# Patient Record
Sex: FEMALE | Race: WHITE | Employment: OTHER | URBAN - METROPOLITAN AREA
[De-identification: names, ages, dates, MRNs, and addresses within clinical notes are randomized per-mention and may not be internally consistent; named-entity substitution may affect disease eponyms.]

---

## 2018-09-30 ENCOUNTER — HOSPITAL ENCOUNTER (EMERGENCY)
Age: 78
Discharge: HOME | End: 2018-09-30
Attending: EMERGENCY MEDICINE
Payer: MEDICARE

## 2018-09-30 ENCOUNTER — APPOINTMENT (OUTPATIENT)
Dept: CT IMAGING | Age: 78
End: 2018-09-30
Payer: MEDICARE

## 2018-09-30 VITALS
RESPIRATION RATE: 16 BRPM | BODY MASS INDEX: 29.39 KG/M2 | OXYGEN SATURATION: 99 % | WEIGHT: 182.1 LBS | DIASTOLIC BLOOD PRESSURE: 58 MMHG | SYSTOLIC BLOOD PRESSURE: 146 MMHG | TEMPERATURE: 98.2 F | HEART RATE: 94 BPM

## 2018-09-30 DIAGNOSIS — R53.1 GENERALIZED WEAKNESS: Primary | ICD-10-CM

## 2018-09-30 LAB
A/G RATIO: 1.4 (ref 1.1–2.2)
ALBUMIN SERPL-MCNC: 3.8 G/DL (ref 3.4–5)
ALP BLD-CCNC: 59 U/L (ref 40–129)
ALT SERPL-CCNC: 13 U/L (ref 10–40)
ANION GAP SERPL CALCULATED.3IONS-SCNC: 16 MMOL/L (ref 3–16)
AST SERPL-CCNC: 23 U/L (ref 15–37)
BASOPHILS ABSOLUTE: 0 K/UL (ref 0–0.2)
BASOPHILS RELATIVE PERCENT: 0.6 %
BILIRUB SERPL-MCNC: 0.8 MG/DL (ref 0–1)
BILIRUBIN URINE: NEGATIVE
BLOOD, URINE: NEGATIVE
BUN BLDV-MCNC: 18 MG/DL (ref 7–20)
CALCIUM SERPL-MCNC: 9.6 MG/DL (ref 8.3–10.6)
CHLORIDE BLD-SCNC: 101 MMOL/L (ref 99–110)
CLARITY: CLEAR
CO2: 21 MMOL/L (ref 21–32)
COLOR: YELLOW
CREAT SERPL-MCNC: 0.6 MG/DL (ref 0.6–1.2)
EOSINOPHILS ABSOLUTE: 0.1 K/UL (ref 0–0.6)
EOSINOPHILS RELATIVE PERCENT: 2.4 %
EPITHELIAL CELLS, UA: 3 /HPF (ref 0–5)
GFR AFRICAN AMERICAN: >60
GFR NON-AFRICAN AMERICAN: >60
GLOBULIN: 2.8 G/DL
GLUCOSE BLD-MCNC: 121 MG/DL (ref 70–99)
GLUCOSE URINE: NEGATIVE MG/DL
HCT VFR BLD CALC: 32.9 % (ref 36–48)
HEMOGLOBIN: 11.3 G/DL (ref 12–16)
HYALINE CASTS: 1 /LPF (ref 0–8)
KETONES, URINE: NEGATIVE MG/DL
LACTIC ACID: 1.2 MMOL/L (ref 0.4–2)
LEUKOCYTE ESTERASE, URINE: ABNORMAL
LYMPHOCYTES ABSOLUTE: 1.5 K/UL (ref 1–5.1)
LYMPHOCYTES RELATIVE PERCENT: 24.6 %
MCH RBC QN AUTO: 32 PG (ref 26–34)
MCHC RBC AUTO-ENTMCNC: 34.3 G/DL (ref 31–36)
MCV RBC AUTO: 93.5 FL (ref 80–100)
MICROSCOPIC EXAMINATION: YES
MONOCYTES ABSOLUTE: 0.3 K/UL (ref 0–1.3)
MONOCYTES RELATIVE PERCENT: 5.6 %
NEUTROPHILS ABSOLUTE: 3.9 K/UL (ref 1.7–7.7)
NEUTROPHILS RELATIVE PERCENT: 66.8 %
NITRITE, URINE: NEGATIVE
PDW BLD-RTO: 12.7 % (ref 12.4–15.4)
PH UA: 7.5
PLATELET # BLD: 169 K/UL (ref 135–450)
PMV BLD AUTO: 8.6 FL (ref 5–10.5)
POTASSIUM REFLEX MAGNESIUM: 3.7 MMOL/L (ref 3.5–5.1)
PROTEIN UA: NEGATIVE MG/DL
RBC # BLD: 3.52 M/UL (ref 4–5.2)
RBC UA: 1 /HPF (ref 0–4)
SODIUM BLD-SCNC: 138 MMOL/L (ref 136–145)
SPECIFIC GRAVITY UA: 1
TOTAL PROTEIN: 6.6 G/DL (ref 6.4–8.2)
TROPONIN: <0.01 NG/ML
URINE REFLEX TO CULTURE: YES
URINE TYPE: ABNORMAL
UROBILINOGEN, URINE: 0.2 E.U./DL
WBC # BLD: 5.9 K/UL (ref 4–11)
WBC UA: 5 /HPF (ref 0–5)

## 2018-09-30 PROCEDURE — 96360 HYDRATION IV INFUSION INIT: CPT

## 2018-09-30 PROCEDURE — 70450 CT HEAD/BRAIN W/O DYE: CPT

## 2018-09-30 PROCEDURE — 85025 COMPLETE CBC W/AUTO DIFF WBC: CPT

## 2018-09-30 PROCEDURE — 2580000003 HC RX 258: Performed by: EMERGENCY MEDICINE

## 2018-09-30 PROCEDURE — 80053 COMPREHEN METABOLIC PANEL: CPT

## 2018-09-30 PROCEDURE — 83605 ASSAY OF LACTIC ACID: CPT

## 2018-09-30 PROCEDURE — 93005 ELECTROCARDIOGRAM TRACING: CPT | Performed by: EMERGENCY MEDICINE

## 2018-09-30 PROCEDURE — 87086 URINE CULTURE/COLONY COUNT: CPT

## 2018-09-30 PROCEDURE — 81001 URINALYSIS AUTO W/SCOPE: CPT

## 2018-09-30 PROCEDURE — 84484 ASSAY OF TROPONIN QUANT: CPT

## 2018-09-30 PROCEDURE — 99285 EMERGENCY DEPT VISIT HI MDM: CPT

## 2018-09-30 PROCEDURE — 96361 HYDRATE IV INFUSION ADD-ON: CPT

## 2018-09-30 RX ORDER — BIOTIN 10000 MCG
10 CAPSULE ORAL DAILY
COMMUNITY

## 2018-09-30 RX ORDER — 0.9 % SODIUM CHLORIDE 0.9 %
1000 INTRAVENOUS SOLUTION INTRAVENOUS ONCE
Status: COMPLETED | OUTPATIENT
Start: 2018-09-30 | End: 2018-09-30

## 2018-09-30 RX ORDER — LANOLIN ALCOHOL/MO/W.PET/CERES
CREAM (GRAM) TOPICAL
COMMUNITY
Start: 2016-05-19

## 2018-09-30 RX ADMIN — SODIUM CHLORIDE 1000 ML: 9 INJECTION, SOLUTION INTRAVENOUS at 14:52

## 2018-09-30 ASSESSMENT — ENCOUNTER SYMPTOMS
ABDOMINAL PAIN: 0
VOMITING: 0
COUGH: 0
SORE THROAT: 0
EYE PAIN: 0
DIARRHEA: 1
SHORTNESS OF BREATH: 0
RHINORRHEA: 0
BACK PAIN: 0
NAUSEA: 0
WHEEZING: 0
EYE DISCHARGE: 0

## 2018-09-30 NOTE — ED PROVIDER NOTES
External ear normal.   Mouth/Throat: Mucous membranes are dry. Eyes: Right eye exhibits no discharge. Left eye exhibits no discharge. No scleral icterus. Neck: Normal range of motion. No JVD present. No tracheal deviation present. No thyromegaly present. Cardiovascular: Normal rate and regular rhythm. Exam reveals no gallop and no friction rub. Murmur heard. Systolic murmur is present with a grade of 1/6   Pulmonary/Chest: Effort normal and breath sounds normal. No stridor. No respiratory distress. She has no wheezes. She has no rales. Abdominal: Soft. She exhibits no distension. There is no tenderness. There is no rebound and no guarding. Musculoskeletal: She exhibits no edema or tenderness. Neurological: She is alert and oriented to person, place, and time. No cranial nerve deficit or sensory deficit. She exhibits abnormal muscle tone. Coordination normal.   She is diffusely weak without focal findings. Skin: Skin is warm and dry. No rash (On exposed body surfaces) noted. She is not diaphoretic. There is pallor. Psychiatric: She has a normal mood and affect. Her behavior is normal. Thought content normal. She exhibits abnormal recent memory.        DIAGNOSTIC RESULTS   LABS:    Results for orders placed or performed during the hospital encounter of 09/30/18   CBC auto differential   Result Value Ref Range    WBC 5.9 4.0 - 11.0 K/uL    RBC 3.52 (L) 4.00 - 5.20 M/uL    Hemoglobin 11.3 (L) 12.0 - 16.0 g/dL    Hematocrit 32.9 (L) 36.0 - 48.0 %    MCV 93.5 80.0 - 100.0 fL    MCH 32.0 26.0 - 34.0 pg    MCHC 34.3 31.0 - 36.0 g/dL    RDW 12.7 12.4 - 15.4 %    Platelets 678 636 - 838 K/uL    MPV 8.6 5.0 - 10.5 fL    Neutrophils % 66.8 %    Lymphocytes % 24.6 %    Monocytes % 5.6 %    Eosinophils % 2.4 %    Basophils % 0.6 %    Neutrophils # 3.9 1.7 - 7.7 K/uL    Lymphocytes # 1.5 1.0 - 5.1 K/uL    Monocytes # 0.3 0.0 - 1.3 K/uL    Eosinophils # 0.1 0.0 - 0.6 K/uL    Basophils # 0.0 0.0 - 0.2 K/uL Comprehensive Metabolic Panel w/ Reflex to MG   Result Value Ref Range    Sodium 138 136 - 145 mmol/L    Potassium reflex Magnesium 3.7 3.5 - 5.1 mmol/L    Chloride 101 99 - 110 mmol/L    CO2 21 21 - 32 mmol/L    Anion Gap 16 3 - 16    Glucose 121 (H) 70 - 99 mg/dL    BUN 18 7 - 20 mg/dL    CREATININE 0.6 0.6 - 1.2 mg/dL    GFR Non-African American >60 >60    GFR African American >60 >60    Calcium 9.6 8.3 - 10.6 mg/dL    Total Protein 6.6 6.4 - 8.2 g/dL    Alb 3.8 3.4 - 5.0 g/dL    Albumin/Globulin Ratio 1.4 1.1 - 2.2    Total Bilirubin 0.8 0.0 - 1.0 mg/dL    Alkaline Phosphatase 59 40 - 129 U/L    ALT 13 10 - 40 U/L    AST 23 15 - 37 U/L    Globulin 2.8 g/dL   Troponin   Result Value Ref Range    Troponin <0.01 <0.01 ng/mL   Urine Reflex to Culture   Result Value Ref Range    Color, UA YELLOW Straw/Yellow    Clarity, UA Clear Clear    Glucose, Ur Negative Negative mg/dL    Bilirubin Urine Negative Negative    Ketones, Urine Negative Negative mg/dL    Specific Gravity, UA 1.005 1.005 - 1.030    Blood, Urine Negative Negative    pH, UA 7.5 5.0 - 8.0    Protein, UA Negative Negative mg/dL    Urobilinogen, Urine 0.2 <2.0 E.U./dL    Nitrite, Urine Negative Negative    Leukocyte Esterase, Urine TRACE (A) Negative    Microscopic Examination YES     Urine Reflex to Culture Yes     Urine Type Clean catch    Lactic acid, plasma   Result Value Ref Range    Lactic Acid 1.2 0.4 - 2.0 mmol/L   Microscopic Urinalysis   Result Value Ref Range    Hyaline Casts, UA 1 0 - 8 /LPF    WBC, UA 5 0 - 5 /HPF    RBC, UA 1 0 - 4 /HPF    Epi Cells 3 0 - 5 /HPF       All other labs were within normal range or not returned as of this dictation. EKG: All EKG's are interpreted by the Emergency Department Physician who either signs or Co-signs this chart in the absence of a cardiologist.      EKG visualized interpreted by myself shows sinus rhythm. There is left atrial enlargement the axis is 52.   Otherwise ST-T wave intervals are all

## 2018-10-01 LAB
EKG ATRIAL RATE: 74 BPM
EKG DIAGNOSIS: NORMAL
EKG P AXIS: 76 DEGREES
EKG P-R INTERVAL: 196 MS
EKG Q-T INTERVAL: 412 MS
EKG QRS DURATION: 88 MS
EKG QTC CALCULATION (BAZETT): 457 MS
EKG R AXIS: 52 DEGREES
EKG T AXIS: 55 DEGREES
EKG VENTRICULAR RATE: 74 BPM
URINE CULTURE, ROUTINE: NORMAL

## 2018-10-01 PROCEDURE — 93010 ELECTROCARDIOGRAM REPORT: CPT | Performed by: INTERNAL MEDICINE

## 2018-11-16 ENCOUNTER — APPOINTMENT (OUTPATIENT)
Dept: CT IMAGING | Age: 78
End: 2018-11-16
Payer: MEDICARE

## 2018-11-16 ENCOUNTER — APPOINTMENT (OUTPATIENT)
Dept: GENERAL RADIOLOGY | Age: 78
End: 2018-11-16
Payer: MEDICARE

## 2018-11-16 ENCOUNTER — HOSPITAL ENCOUNTER (EMERGENCY)
Age: 78
Discharge: HOME | End: 2018-11-16
Payer: MEDICARE

## 2018-11-16 VITALS
RESPIRATION RATE: 18 BRPM | HEIGHT: 62 IN | BODY MASS INDEX: 30.35 KG/M2 | WEIGHT: 164.9 LBS | TEMPERATURE: 98 F | HEART RATE: 82 BPM | SYSTOLIC BLOOD PRESSURE: 126 MMHG | OXYGEN SATURATION: 98 % | DIASTOLIC BLOOD PRESSURE: 82 MMHG
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
## 2018-11-16 DIAGNOSIS — M54.5 LOW BACK PAIN, UNSPECIFIED BACK PAIN LATERALITY, UNSPECIFIED CHRONICITY, WITH SCIATICA PRESENCE UNSPECIFIED: ICD-10-CM

## 2018-11-16 DIAGNOSIS — M54.2 NECK PAIN: ICD-10-CM

## 2018-11-16 DIAGNOSIS — S42.215A CLOSED NONDISPLACED FRACTURE OF SURGICAL NECK OF LEFT HUMERUS, UNSPECIFIED FRACTURE MORPHOLOGY, INITIAL ENCOUNTER: ICD-10-CM

## 2018-11-16 LAB
GLUCOSE BLD-MCNC: 109 MG/DL (ref 70–99)
PERFORMED ON: ABNORMAL

## 2018-11-16 PROCEDURE — 73030 X-RAY EXAM OF SHOULDER: CPT

## 2018-11-16 PROCEDURE — 72125 CT NECK SPINE W/O DYE: CPT

## 2018-11-16 PROCEDURE — 6370000000 HC RX 637 (ALT 250 FOR IP): Performed by: PHYSICIAN ASSISTANT

## 2018-11-16 PROCEDURE — 70450 CT HEAD/BRAIN W/O DYE: CPT

## 2018-11-16 PROCEDURE — 99284 EMERGENCY DEPT VISIT MOD MDM: CPT

## 2018-11-16 PROCEDURE — 73070 X-RAY EXAM OF ELBOW: CPT

## 2018-11-16 PROCEDURE — 72131 CT LUMBAR SPINE W/O DYE: CPT

## 2018-11-16 RX ORDER — OXYCODONE HYDROCHLORIDE AND ACETAMINOPHEN 5; 325 MG/1; MG/1
1 TABLET ORAL ONCE
Status: COMPLETED | OUTPATIENT
Start: 2018-11-16 | End: 2018-11-16

## 2018-11-16 RX ADMIN — OXYCODONE AND ACETAMINOPHEN 1 TABLET: 5; 325 TABLET ORAL at 17:32

## 2018-11-16 ASSESSMENT — ENCOUNTER SYMPTOMS
VOMITING: 0
ABDOMINAL PAIN: 0
SHORTNESS OF BREATH: 0
COLOR CHANGE: 0

## 2018-11-16 ASSESSMENT — PAIN DESCRIPTION - ONSET: ONSET: SUDDEN

## 2018-11-16 ASSESSMENT — PAIN SCALES - GENERAL
PAINLEVEL_OUTOF10: 2
PAINLEVEL_OUTOF10: 8
PAINLEVEL_OUTOF10: 10
PAINLEVEL_OUTOF10: 10
PAINLEVEL_OUTOF10: 6

## 2018-11-16 ASSESSMENT — PAIN DESCRIPTION - ORIENTATION: ORIENTATION: LEFT

## 2018-11-16 ASSESSMENT — PAIN DESCRIPTION - PAIN TYPE: TYPE: ACUTE PAIN

## 2018-11-16 ASSESSMENT — PAIN DESCRIPTION - DESCRIPTORS: DESCRIPTORS: SHARP

## 2018-11-16 NOTE — ED PROVIDER NOTES
Pulse Resp SpO2 Height Weight   -- -- -- -- -- -- -- --     Physical Exam   Constitutional: She is oriented to person, place, and time. She appears well-developed and well-nourished. No distress. HENT:   Head: Normocephalic and atraumatic. Eyes: Pupils are equal, round, and reactive to light. Neck: Muscular tenderness present. No spinous process tenderness present. Cardiovascular: Intact distal pulses. Tachycardia present. Pulmonary/Chest: Effort normal. No respiratory distress. Musculoskeletal:        Left shoulder: She exhibits decreased range of motion, tenderness, bony tenderness and pain. She exhibits no deformity and normal pulse. Neurological: She is alert and oriented to person, place, and time. No cranial nerve deficit. Skin: Skin is warm. Psychiatric: She has a normal mood and affect. Her behavior is normal.   Nursing note and vitals reviewed. DIAGNOSTIC RESULTS     RADIOLOGY:   Non-plain film images such as CT, Ultrasound and MRI are read by the radiologist. Plain radiographic images are visualized and preliminarily interpreted by MARTINEZ Woods with the below findings:    Reviewed radiologist's interpretation. Interpretation per the Radiologist below, if available at the time of this note:    [place]   Final Result   No evidence of acute fracture. XR SHOULDER LEFT (MIN 2 VIEWS)   Final Result   Acute traumatic fracture of left humeral neck. XR ELBOW LEFT (2 VIEWS)   Preliminary Result   No acute abnormality of the left elbow. CT Head WO Contrast   Preliminary Result   No acute intracranial abnormality. Cerebral atrophy. Atherosclerotic calcification of the cavernous carotid   arteries and vertebral arteries. CT Cervical Spine WO Contrast   Final Result   No evidence of acute fracture.                LABS:  Labs Reviewed   POCT GLUCOSE - Abnormal; Notable for the following:        Result Value    POC Glucose 109

## 2018-11-17 NOTE — ED NOTES
Fall risk screening completed. Fall risk bracelet applied to patient. Non-skid socks provided and placed on patient. The call light is within the patient's reach, and instructions for use were provided. The bed is in the lowest position with wheels locked. The patient has been advised to notify staff, using the call light, if there is a need to get up or use restroom. The patient and family verbalized understanding of safety precautions and how to contact staff for assistance.       Bassam Badillo RN  11/16/18 [id number]

## 2018-11-21 ENCOUNTER — OFFICE VISIT (OUTPATIENT)
Dept: ORTHOPEDIC SURGERY | Age: 78
End: 2018-11-21
Payer: MEDICARE

## 2018-11-21 VITALS — HEIGHT: 62 IN | BODY MASS INDEX: 32.2 KG/M2 | WEIGHT: 175 LBS | RESPIRATION RATE: 16 BRPM

## 2018-11-21 DIAGNOSIS — S42.292A OTHER CLOSED DISPLACED FRACTURE OF PROXIMAL END OF LEFT HUMERUS, INITIAL ENCOUNTER: Primary | ICD-10-CM

## 2018-11-21 PROBLEM — S42.202A CLOSED FRACTURE OF LEFT PROXIMAL HUMERUS: Status: ACTIVE | Noted: 2018-11-21

## 2018-11-21 PROCEDURE — 1123F ACP DISCUSS/DSCN MKR DOCD: CPT | Performed by: ORTHOPAEDIC SURGERY

## 2018-11-21 PROCEDURE — 4040F PNEUMOC VAC/ADMIN/RCVD: CPT | Performed by: ORTHOPAEDIC SURGERY

## 2018-11-21 PROCEDURE — G8484 FLU IMMUNIZE NO ADMIN: HCPCS | Performed by: ORTHOPAEDIC SURGERY

## 2018-11-21 PROCEDURE — G8400 PT W/DXA NO RESULTS DOC: HCPCS | Performed by: ORTHOPAEDIC SURGERY

## 2018-11-21 PROCEDURE — 23600 CLTX PROX HUMRL FX W/O MNPJ: CPT | Performed by: ORTHOPAEDIC SURGERY

## 2018-11-21 PROCEDURE — 1101F PT FALLS ASSESS-DOCD LE1/YR: CPT | Performed by: ORTHOPAEDIC SURGERY

## 2018-11-21 PROCEDURE — 99203 OFFICE O/P NEW LOW 30 MIN: CPT | Performed by: ORTHOPAEDIC SURGERY

## 2018-11-21 PROCEDURE — G8417 CALC BMI ABV UP PARAM F/U: HCPCS | Performed by: ORTHOPAEDIC SURGERY

## 2018-11-21 PROCEDURE — G8427 DOCREV CUR MEDS BY ELIG CLIN: HCPCS | Performed by: ORTHOPAEDIC SURGERY

## 2018-11-21 PROCEDURE — 1090F PRES/ABSN URINE INCON ASSESS: CPT | Performed by: ORTHOPAEDIC SURGERY

## 2018-11-21 PROCEDURE — L3660 SO 8 AB RSTR CAN/WEB PRE OTS: HCPCS | Performed by: ORTHOPAEDIC SURGERY

## 2018-11-21 PROCEDURE — 1036F TOBACCO NON-USER: CPT | Performed by: ORTHOPAEDIC SURGERY

## 2018-12-03 NOTE — PROGRESS NOTES
Take 40 mg by mouth daily.  esomeprazole Magnesium (NEXIUM) 40 MG PACK Take 40 mg by mouth daily.  Multiple Vitamins-Minerals (THERAPEUTIC MULTIVITAMIN-MINERALS) tablet Take 1 tablet by mouth daily.  aspirin 81 MG chewable tablet Take 81 mg by mouth daily.  insulin lispro (HUMALOG) 100 UNIT/ML injection vial Inject 8-14 Units into the skin 4 times daily (before meals and nightly).  insulin detemir (LEVEMIR) 100 UNIT/ML injection vial Inject 20 Units into the skin nightly.  ondansetron (ZOFRAN ODT) 4 MG disintegrating tablet Take 1 tablet by mouth every 12 hours as needed for Nausea. 12 tablet 0    lisinopril (PRINIVIL;ZESTRIL) 20 MG tablet Take 5 mg by mouth daily       traMADol (ULTRAM) 50 MG tablet Take 50 mg by mouth every 6 hours as needed for Pain.  ibuprofen (ADVIL;MOTRIN) 200 MG tablet Take 200 mg by mouth every 6 hours as needed for Pain.  naproxen sodium (ALEVE) 220 MG tablet Take 220 mg by mouth 2 times daily (with meals). No current facility-administered medications on file prior to visit. Pertinent items are noted in HPI  Review of systems reviewed from Patient History Form dated on 11/21/2018 and available in the patient's chart under the Media tab. No change. PHYSICAL EXAMINATION:  Ms. Rubén Flores is a very pleasant 69 y.o.  female who presents today in no acute distress, awake, alert, and oriented. She is well dressed, nourished and  groomed. Patient with normal affect. Height is  5' 2\" (1.575 m), weight is 175 lb (79.4 kg), Body mass index is 32.01 kg/m². Resting respiratory rate is 16. On evaluation of her bilateral upper extremity, there is no obvious deformity left shoulder. There is minimal swelling and moderate ecchymosis. She is tender to palpation over the shoulder, and otherwise non tender over the remainder of the extremity. Range of motion is decreased secondary to pain over the left shoulder.   The skin overlying the

## 2019-01-09 ENCOUNTER — OFFICE VISIT (OUTPATIENT)
Dept: ORTHOPEDIC SURGERY | Age: 79
End: 2019-01-09

## 2019-01-09 VITALS
DIASTOLIC BLOOD PRESSURE: 61 MMHG | HEIGHT: 62 IN | BODY MASS INDEX: 32.2 KG/M2 | SYSTOLIC BLOOD PRESSURE: 132 MMHG | WEIGHT: 175 LBS | HEART RATE: 73 BPM

## 2019-01-09 DIAGNOSIS — S42.292A OTHER CLOSED DISPLACED FRACTURE OF PROXIMAL END OF LEFT HUMERUS, INITIAL ENCOUNTER: Primary | ICD-10-CM

## 2019-01-09 PROCEDURE — 99024 POSTOP FOLLOW-UP VISIT: CPT | Performed by: NURSE PRACTITIONER

## 2019-01-09 PROCEDURE — APPNB30 APP NON BILLABLE TIME 0-30 MINS: Performed by: NURSE PRACTITIONER

## 2019-03-13 ENCOUNTER — OFFICE VISIT (OUTPATIENT)
Dept: ORTHOPEDIC SURGERY | Age: 79
End: 2019-03-13
Payer: MEDICARE

## 2019-03-13 VITALS — HEART RATE: 64 BPM | BODY MASS INDEX: 32.2 KG/M2 | RESPIRATION RATE: 16 BRPM | WEIGHT: 175 LBS | HEIGHT: 62 IN

## 2019-03-13 DIAGNOSIS — S42.292A OTHER CLOSED DISPLACED FRACTURE OF PROXIMAL END OF LEFT HUMERUS, INITIAL ENCOUNTER: Primary | ICD-10-CM

## 2019-03-13 PROCEDURE — G8484 FLU IMMUNIZE NO ADMIN: HCPCS | Performed by: ORTHOPAEDIC SURGERY

## 2019-03-13 PROCEDURE — 99213 OFFICE O/P EST LOW 20 MIN: CPT | Performed by: ORTHOPAEDIC SURGERY

## 2019-03-13 PROCEDURE — 1036F TOBACCO NON-USER: CPT | Performed by: ORTHOPAEDIC SURGERY

## 2019-03-13 PROCEDURE — 1101F PT FALLS ASSESS-DOCD LE1/YR: CPT | Performed by: ORTHOPAEDIC SURGERY

## 2019-03-13 PROCEDURE — G8400 PT W/DXA NO RESULTS DOC: HCPCS | Performed by: ORTHOPAEDIC SURGERY

## 2019-03-13 PROCEDURE — 1123F ACP DISCUSS/DSCN MKR DOCD: CPT | Performed by: ORTHOPAEDIC SURGERY

## 2019-03-13 PROCEDURE — 1090F PRES/ABSN URINE INCON ASSESS: CPT | Performed by: ORTHOPAEDIC SURGERY

## 2019-03-13 PROCEDURE — 4040F PNEUMOC VAC/ADMIN/RCVD: CPT | Performed by: ORTHOPAEDIC SURGERY

## 2019-03-13 PROCEDURE — G8417 CALC BMI ABV UP PARAM F/U: HCPCS | Performed by: ORTHOPAEDIC SURGERY

## 2019-03-13 PROCEDURE — G8427 DOCREV CUR MEDS BY ELIG CLIN: HCPCS | Performed by: ORTHOPAEDIC SURGERY
